# Patient Record
(demographics unavailable — no encounter records)

---

## 2025-06-19 NOTE — HISTORY OF PRESENT ILLNESS
[de-identified] : 44-year-old male presents for evaluation of right knee pain x 2 weeks. He slid on to his knee. He went to urgent care on 6/15/25 and had x-rays which we have for review. He complains of intermittent pain worse with running, squatting and kneeling. He has swelling over the anterior knee. He has tried MDP prescribed by the PCP which has been somewhat helpful. He has been icing as well. He denies prior injuries.

## 2025-06-19 NOTE — PHYSICAL EXAM
[Normal] : Gait: normal [DP] : dorsalis pedis 2+ and symmetric bilaterally [PT] : posterior tibial 2+ and symmetric bilaterally [de-identified] : The patient has no respiratory distress. Mood and affect are normal. The patient is alert and oriented to person, place and time. There is no pain with active or passive motion of the hips.  There is no tenderness of either hip.  Examination of the right knee demonstrates swelling of the prepatellar bursa.  The skin is intact.  There is a scar from old laceration which is well-healed.  Quadriceps and hamstring function are intact.  There is no instability of collateral or cruciate ligaments.  Range of motion 0 to 115 degrees on the right, 0 to 125 degrees on the left.  The calves are soft and nontender.  The skin is intact.  There is no lymphedema. [de-identified] : Procedure was performed at the [facility] EXAM: KNEE RIGHT PROCEDURE DATE: 06/15/2025 INTERPRETATION: INDICATION: Right knee pain  TECHNIQUE: 3 views of the right knee  COMPARISON: None available  FINDINGS:  There is no fracture or dislocation. The joint spaces and alignment are preserved. There is possible prepatellar bursitis.   IMPRESSION:  No acute fracture.  --- End of Report ---    CRISTOFER MATHIS-INDY DOMINGUEZ; Attending Radiologist This document has been electronically signed. Gold 15 2025 4:44PM

## 2025-06-19 NOTE — DISCUSSION/SUMMARY
[de-identified] : The patient has traumatic prepatellar bursitis of the right knee.  There is no evidence of fracture or dislocation.  I have discussed the pathology, natural history and treatment options with him.  The fluid will continue to recede on its own.  He may bear weight to tolerance and work on knee range of motion exercises.  If symptomatic in 1 month he can be reevaluated.